# Patient Record
Sex: MALE | Race: WHITE | NOT HISPANIC OR LATINO | Employment: FULL TIME
[De-identification: names, ages, dates, MRNs, and addresses within clinical notes are randomized per-mention and may not be internally consistent; named-entity substitution may affect disease eponyms.]

---

## 2019-11-03 ENCOUNTER — HEALTH MAINTENANCE LETTER (OUTPATIENT)
Age: 29
End: 2019-11-03

## 2020-11-16 ENCOUNTER — HEALTH MAINTENANCE LETTER (OUTPATIENT)
Age: 30
End: 2020-11-16

## 2021-09-18 ENCOUNTER — HEALTH MAINTENANCE LETTER (OUTPATIENT)
Age: 31
End: 2021-09-18

## 2022-01-08 ENCOUNTER — HEALTH MAINTENANCE LETTER (OUTPATIENT)
Age: 32
End: 2022-01-08

## 2022-02-06 ENCOUNTER — TELEPHONE (OUTPATIENT)
Dept: INTERNAL MEDICINE | Facility: CLINIC | Age: 32
End: 2022-02-06
Payer: COMMERCIAL

## 2022-02-07 NOTE — TELEPHONE ENCOUNTER
Spoke to patient and let him know provider not taking new patients - offered appointment in March with Cassidy Reynolds but he will look for a provider closer to his house

## 2022-02-07 NOTE — TELEPHONE ENCOUNTER
Family practice pt is scheduled with me for a physical.  My practice is closed, please have him schedule PE with PCP.

## 2022-07-12 ENCOUNTER — HOSPITAL ENCOUNTER (EMERGENCY)
Facility: CLINIC | Age: 32
Discharge: HOME | End: 2022-07-12
Attending: EMERGENCY MEDICINE | Admitting: EMERGENCY MEDICINE

## 2022-07-12 VITALS
TEMPERATURE: 98.7 F | SYSTOLIC BLOOD PRESSURE: 134 MMHG | OXYGEN SATURATION: 97 % | HEART RATE: 78 BPM | RESPIRATION RATE: 18 BRPM | DIASTOLIC BLOOD PRESSURE: 81 MMHG

## 2022-07-12 DIAGNOSIS — F10.920 ALCOHOLIC INTOXICATION WITHOUT COMPLICATION (H): ICD-10-CM

## 2022-07-12 DIAGNOSIS — F32.A DEPRESSION, UNSPECIFIED DEPRESSION TYPE: ICD-10-CM

## 2022-07-12 LAB
ALBUMIN SERPL-MCNC: 4.4 G/DL (ref 3.4–5)
ALCOHOL BREATH TEST: 0.28 (ref 0–0.01)
ALP SERPL-CCNC: 47 U/L (ref 40–150)
ALT SERPL W P-5'-P-CCNC: 119 U/L (ref 0–70)
AMPHETAMINES UR QL SCN: NORMAL
ANION GAP SERPL CALCULATED.3IONS-SCNC: 9 MMOL/L (ref 3–14)
AST SERPL W P-5'-P-CCNC: 162 U/L (ref 0–45)
BARBITURATES UR QL: NORMAL
BASOPHILS # BLD AUTO: 0 10E3/UL (ref 0–0.2)
BASOPHILS NFR BLD AUTO: 0 %
BENZODIAZ UR QL: NORMAL
BILIRUB SERPL-MCNC: 0.4 MG/DL (ref 0.2–1.3)
BUN SERPL-MCNC: 14 MG/DL (ref 7–30)
CALCIUM SERPL-MCNC: 9 MG/DL (ref 8.5–10.1)
CANNABINOIDS UR QL SCN: NORMAL
CHLORIDE BLD-SCNC: 108 MMOL/L (ref 94–109)
CO2 SERPL-SCNC: 29 MMOL/L (ref 20–32)
COCAINE UR QL: NORMAL
CREAT SERPL-MCNC: 0.69 MG/DL (ref 0.66–1.25)
EOSINOPHIL # BLD AUTO: 0 10E3/UL (ref 0–0.7)
EOSINOPHIL NFR BLD AUTO: 0 %
ERYTHROCYTE [DISTWIDTH] IN BLOOD BY AUTOMATED COUNT: 13.4 % (ref 10–15)
GFR SERPL CREATININE-BSD FRML MDRD: >90 ML/MIN/1.73M2
GLUCOSE BLD-MCNC: 120 MG/DL (ref 70–99)
HCT VFR BLD AUTO: 45.5 % (ref 40–53)
HGB BLD-MCNC: 15.6 G/DL (ref 13.3–17.7)
IMM GRANULOCYTES # BLD: 0 10E3/UL
IMM GRANULOCYTES NFR BLD: 0 %
LYMPHOCYTES # BLD AUTO: 3.2 10E3/UL (ref 0.8–5.3)
LYMPHOCYTES NFR BLD AUTO: 36 %
MCH RBC QN AUTO: 31 PG (ref 26.5–33)
MCHC RBC AUTO-ENTMCNC: 34.3 G/DL (ref 31.5–36.5)
MCV RBC AUTO: 91 FL (ref 78–100)
MONOCYTES # BLD AUTO: 1.1 10E3/UL (ref 0–1.3)
MONOCYTES NFR BLD AUTO: 13 %
NEUTROPHILS # BLD AUTO: 4.6 10E3/UL (ref 1.6–8.3)
NEUTROPHILS NFR BLD AUTO: 51 %
NRBC # BLD AUTO: 0 10E3/UL
NRBC BLD AUTO-RTO: 0 /100
OPIATES UR QL SCN: NORMAL
PLAT MORPH BLD: NORMAL
PLATELET # BLD AUTO: 220 10E3/UL (ref 150–450)
POTASSIUM BLD-SCNC: 3.5 MMOL/L (ref 3.4–5.3)
PROT SERPL-MCNC: 7.8 G/DL (ref 6.8–8.8)
RBC # BLD AUTO: 5.03 10E6/UL (ref 4.4–5.9)
RBC MORPH BLD: NORMAL
SARS-COV-2 RNA RESP QL NAA+PROBE: NEGATIVE
SODIUM SERPL-SCNC: 146 MMOL/L (ref 133–144)
WBC # BLD AUTO: 9 10E3/UL (ref 4–11)

## 2022-07-12 PROCEDURE — U0003 INFECTIOUS AGENT DETECTION BY NUCLEIC ACID (DNA OR RNA); SEVERE ACUTE RESPIRATORY SYNDROME CORONAVIRUS 2 (SARS-COV-2) (CORONAVIRUS DISEASE [COVID-19]), AMPLIFIED PROBE TECHNIQUE, MAKING USE OF HIGH THROUGHPUT TECHNOLOGIES AS DESCRIBED BY CMS-2020-01-R: HCPCS | Performed by: EMERGENCY MEDICINE

## 2022-07-12 PROCEDURE — 250N000013 HC RX MED GY IP 250 OP 250 PS 637: Performed by: EMERGENCY MEDICINE

## 2022-07-12 PROCEDURE — 82075 ASSAY OF BREATH ETHANOL: CPT | Performed by: EMERGENCY MEDICINE

## 2022-07-12 PROCEDURE — C9803 HOPD COVID-19 SPEC COLLECT: HCPCS | Performed by: EMERGENCY MEDICINE

## 2022-07-12 PROCEDURE — 80053 COMPREHEN METABOLIC PANEL: CPT | Performed by: EMERGENCY MEDICINE

## 2022-07-12 PROCEDURE — 36415 COLL VENOUS BLD VENIPUNCTURE: CPT | Performed by: EMERGENCY MEDICINE

## 2022-07-12 PROCEDURE — 99285 EMERGENCY DEPT VISIT HI MDM: CPT | Mod: 25 | Performed by: EMERGENCY MEDICINE

## 2022-07-12 PROCEDURE — 85025 COMPLETE CBC W/AUTO DIFF WBC: CPT | Performed by: EMERGENCY MEDICINE

## 2022-07-12 PROCEDURE — 80307 DRUG TEST PRSMV CHEM ANLYZR: CPT | Performed by: EMERGENCY MEDICINE

## 2022-07-12 PROCEDURE — 99284 EMERGENCY DEPT VISIT MOD MDM: CPT | Performed by: EMERGENCY MEDICINE

## 2022-07-12 PROCEDURE — 90791 PSYCH DIAGNOSTIC EVALUATION: CPT

## 2022-07-12 RX ADMIN — NICOTINE POLACRILEX 4 MG: 4 GUM, CHEWING BUCCAL at 20:57

## 2022-07-12 RX ADMIN — NICOTINE POLACRILEX 4 MG: 4 GUM, CHEWING BUCCAL at 22:56

## 2022-07-12 RX ADMIN — NICOTINE POLACRILEX 4 MG: 4 GUM, CHEWING BUCCAL at 19:57

## 2022-07-12 NOTE — ED NOTES
Bed: HW07UR  Expected date: 7/12/22  Expected time: 6:45 PM  Means of arrival: Ambulance  Comments:  [facility] [place] 32[place], mental health eval, ETOH

## 2022-07-12 NOTE — ED TRIAGE NOTES
Triage Assessment     Row Name 07/12/22 1855       Triage Assessment (Adult)    Airway WDL WDL       Respiratory WDL    Respiratory WDL WDL       Skin Circulation/Temperature WDL    Skin Circulation/Temperature WDL WDL       Cardiac WDL    Cardiac WDL WDL       Peripheral/Neurovascular WDL    Peripheral Neurovascular WDL WDL       Cognitive/Neuro/Behavioral WDL    Cognitive/Neuro/Behavioral WDL WDL    Row Name 07/12/22 1850       Triage Assessment (Adult)    Airway WDL WDL       Respiratory WDL    Respiratory WDL WDL       Skin Circulation/Temperature WDL    Skin Circulation/Temperature WDL WDL       Cardiac WDL    Cardiac WDL WDL       Peripheral/Neurovascular WDL    Peripheral Neurovascular WDL WDL       Cognitive/Neuro/Behavioral WDL    Cognitive/Neuro/Behavioral WDL WDL

## 2022-07-12 NOTE — ED TRIAGE NOTES
Paramedics state that he has been binging for the last four days, girlfriend broke up with him. .     Triage Assessment     Row Name 07/12/22 3021       Triage Assessment (Adult)    Airway WDL WDL       Respiratory WDL    Respiratory WDL WDL       Skin Circulation/Temperature WDL    Skin Circulation/Temperature WDL WDL       Cardiac WDL    Cardiac WDL WDL       Peripheral/Neurovascular WDL    Peripheral Neurovascular WDL WDL       Cognitive/Neuro/Behavioral WDL    Cognitive/Neuro/Behavioral WDL WDL

## 2022-07-13 ASSESSMENT — ENCOUNTER SYMPTOMS
DYSPHORIC MOOD: 1
FEVER: 0
EYE REDNESS: 0
HEADACHES: 0
DIARRHEA: 0
COUGH: 0
CONSTIPATION: 0
DIFFICULTY URINATING: 0
BACK PAIN: 0
NAUSEA: 0
ABDOMINAL PAIN: 0
SHORTNESS OF BREATH: 0

## 2022-07-13 NOTE — ED NOTES
Patient's alcohol breath test is currently .284. Patient can receive DEC assessment upon sobering.

## 2022-07-13 NOTE — ED PROVIDER NOTES
ED Provider Note  [facility]      History     Chief Complaint   Patient presents with     Alcohol Intoxication     Patient states he is here for detox, relapsed a week ago, drinking a liter a day. He states that he has a hx of seizures.      Suicidal     HPI  Agustin Ac is a 31 year old male who presents to the emergency department initially reporting that he wanted inpatient detox for alcohol withdrawal, last drink earlier today.  Also having suicidal thoughts of hurting himself by drinking.  He otherwise has no plan.  He reports that he is also looking to establish mental health care with a counselor or therapist.  He has not on any medication currently.  He drinks approximately 1 L daily, has a history of seizures.  When I spoke to him, however, he reports that he is not interested in detox at this time.  He has no other medical complaints, in his usual state of health otherwise.    Past Medical History  Past Medical History:   Diagnosis Date     Allergic rhinitis, cause unspecified     trees, milk, soy     S/P tonsillectomy 3/31/2011    [facility]     Past Surgical History:   Procedure Laterality Date     NO HISTORY OF SURGERY       TONSILLECTOMY  3/31/2011    [facility]     azelastine (ASTELIN) 137 MCG/SPRAY nasal spray  MULTI-VITAMIN OR TABS  sertraline (ZOLOFT) 25 MG tablet      Allergies   Allergen Reactions     Ambien [Zolpidem Tartrate]      Strange behavior     Family History  Family History   Problem Relation Age of Onset     Family History Negative Father      Family History Negative Mother      Social History   Social History     Tobacco Use     Smoking status: Former Smoker     Packs/day: 1.00     Years: 1.00     Pack years: 1.00     Types: Cigarettes     Smokeless tobacco: Former User     Quit date: 3/31/2011     Tobacco comment: 5cpd   Substance Use Topics     Alcohol use: No     Alcohol/week: 0.0 standard drinks     Drug use: No      Past medical history, past surgical  history, medications, allergies, family history, and social history were reviewed with the patient. No additional pertinent items.       Review of Systems   Constitutional: Negative for fever.   HENT: Negative for congestion.    Eyes: Negative for redness.   Respiratory: Negative for cough and shortness of breath.    Cardiovascular: Negative for chest pain.   Gastrointestinal: Negative for abdominal pain, constipation, diarrhea and nausea.   Genitourinary: Negative for difficulty urinating.   Musculoskeletal: Negative for back pain.   Skin: Negative for rash.   Neurological: Negative for headaches.   Psychiatric/Behavioral: Positive for dysphoric mood and suicidal ideas.     A complete review of systems was performed with pertinent positives and negatives noted in the HPI, and all other systems negative.    Physical Exam   BP: 129/76  Pulse: 96  Temp: 98.2  F (36.8  C)  Resp: 18  SpO2: 98 %  Physical Exam  Constitutional:       General: He is awake. He is not in acute distress.     Appearance: Normal appearance. He is well-developed. He is not ill-appearing or toxic-appearing.   HENT:      Head: Atraumatic.   Eyes:      General: No scleral icterus.     Pupils: Pupils are equal, round, and reactive to light.   Cardiovascular:      Rate and Rhythm: Normal rate and regular rhythm.      Heart sounds: Normal heart sounds, S1 normal and S2 normal.   Pulmonary:      Effort: No respiratory distress.      Breath sounds: Normal breath sounds.   Abdominal:      General: Bowel sounds are normal.      Palpations: Abdomen is soft.      Tenderness: There is no abdominal tenderness.   Musculoskeletal:         General: No tenderness.   Skin:     General: Skin is warm.      Findings: No rash.   Neurological:      Mental Status: He is alert and oriented to person, place, and time.      Motor: No tremor.      Gait: Gait and tandem walk normal.   Psychiatric:         Mood and Affect: Mood normal.         Speech: Speech normal. Speech is  not slurred.         Behavior: Behavior is cooperative.         Thought Content: Thought content includes suicidal ideation.         ED Course      Procedures         Mental Health Risk Assessment      PSS-3    Date and Time Over the past 2 weeks have you felt down, depressed, or hopeless? Over the past 2 weeks have you had thoughts of killing yourself? Have you ever attempted to kill yourself? When did this last happen? User   07/12/22 1856 yes yes no -- AAP      C-SSRS ([place])    Date and Time Q1 Wished to be Dead (Past Month) Q2 Suicidal Thoughts (Past Month) Q3 Suicidal Thought Method Q4 Suicidal Intent without Specific Plan Q5 Suicide Intent with Specific Plan Q6 Suicide Behavior (Lifetime) Within the Past 3 Months? RETIRED: Level of Risk per Screen Screening Not Complete User   07/12/22 1856 yes yes yes no yes no -- -- -- AAP              Suicide assessment completed by mental health (D.EChandaC., LCSW, etc.)       Results for orders placed or performed during the hospital encounter of 07/12/22   Comprehensive metabolic panel     Status: Abnormal   Result Value Ref Range    Sodium 146 (H) 133 - 144 mmol/L    Potassium 3.5 3.4 - 5.3 mmol/L    Chloride 108 94 - 109 mmol/L    Carbon Dioxide (CO2) 29 20 - 32 mmol/L    Anion Gap 9 3 - 14 mmol/L    Urea Nitrogen 14 7 - 30 mg/dL    Creatinine 0.69 0.66 - 1.25 mg/dL    Calcium 9.0 8.5 - 10.1 mg/dL    Glucose 120 (H) 70 - 99 mg/dL    Alkaline Phosphatase 47 40 - 150 U/L     (H) 0 - 45 U/L     (H) 0 - 70 U/L    Protein Total 7.8 6.8 - 8.8 g/dL    Albumin 4.4 3.4 - 5.0 g/dL    Bilirubin Total 0.4 0.2 - 1.3 mg/dL    GFR Estimate >90 >60 mL/min/1.73m2   Asymptomatic COVID-19 Virus (Coronavirus) by PCR Nasopharyngeal     Status: Normal    Specimen: Nasopharyngeal; Swab   Result Value Ref Range    SARS CoV2 PCR Negative Negative    Narrative    Testing was performed using the ramila  SARS-CoV-2 & Influenza A/B Assay on the ramila  [employer]  System.  This test should be  ordered for the detection of SARS-COV-2 in individuals who meet SARS-CoV-2 clinical and/or epidemiological criteria. Test performance is unknown in asymptomatic patients.  This test is for in vitro diagnostic use under the FDA EUA for laboratories certified under CLIA to perform moderate and/or high complexity testing. This test has not been FDA cleared or approved.  A negative test does not rule out the presence of PCR inhibitors in the specimen or target RNA in concentration below the limit of detection for the assay. The possibility of a false negative should be considered if the patient's recent exposure or clinical presentation suggests COVID-19.  [facility] are certified under the Clinical Laboratory Improvement Amendments of 1988 (CLIA-88) as qualified to perform moderate and/or high complexity laboratory testing.   CBC with platelets and differential     Status: None   Result Value Ref Range    WBC Count 9.0 4.0 - 11.0 10e3/uL    RBC Count 5.03 4.40 - 5.90 10e6/uL    Hemoglobin 15.6 13.3 - 17.7 g/dL    Hematocrit 45.5 40.0 - 53.0 %    MCV 91 78 - 100 fL    MCH 31.0 26.5 - 33.0 pg    MCHC 34.3 31.5 - 36.5 g/dL    RDW 13.4 10.0 - 15.0 %    Platelet Count 220 150 - 450 10e3/uL    % Neutrophils 51 %    % Lymphocytes 36 %    % Monocytes 13 %    % Eosinophils 0 %    % Basophils 0 %    % Immature Granulocytes 0 %    NRBCs per 100 WBC 0 <1 /100    Absolute Neutrophils 4.6 1.6 - 8.3 10e3/uL    Absolute Lymphocytes 3.2 0.8 - 5.3 10e3/uL    Absolute Monocytes 1.1 0.0 - 1.3 10e3/uL    Absolute Eosinophils 0.0 0.0 - 0.7 10e3/uL    Absolute Basophils 0.0 0.0 - 0.2 10e3/uL    Absolute Immature Granulocytes 0.0 <=0.4 10e3/uL    Absolute NRBCs 0.0 10e3/uL   Drug abuse screen 1 urine (ED)     Status: Normal   Result Value Ref Range    Amphetamines Urine Screen Negative Screen Negative    Barbiturates Urine Screen Negative Screen Negative    Benzodiazepines Urine Screen Negative Screen Negative     Cannabinoids Urine Screen Negative Screen Negative    Cocaine Urine Screen Negative Screen Negative    Opiates Urine Screen Negative Screen Negative   RBC and Platelet Morphology     Status: None   Result Value Ref Range    Platelet Assessment  Automated Count Confirmed. Platelet morphology is normal.     Automated Count Confirmed. Platelet morphology is normal.    RBC Morphology Confirmed RBC Indices    Alcohol breath test POCT     Status: Abnormal   Result Value Ref Range    Alcohol Breath Test 0.284 (A) 0.00 - 0.01   Urine Drugs of Abuse Screen     Status: Normal    Narrative    The following orders were created for panel order Urine Drugs of Abuse Screen.  Procedure                               Abnormality         Status                     ---------                               -----------         ------                     Drug abuse screen 1 urin...[[id number]]  Normal              Final result                 Please view results for these tests on the individual orders.   CBC with platelets differential     Status: None    Narrative    The following orders were created for panel order CBC with platelets differential.  Procedure                               Abnormality         Status                     ---------                               -----------         ------                     CBC with platelets and d...[[id number]]                      Final result               RBC and Platelet Morphology[[id number]]                      Final result                 Please view results for these tests on the individual orders.     Medications   nicotine polacrilex (NICORETTE) gum 4 mg (4 mg Buccal Given 7/12/22 1957)   nicotine polacrilex (NICORETTE) gum 4 mg (4 mg Buccal Given 7/12/22 2057)   nicotine polacrilex (NICORETTE) gum 4 mg (4 mg Buccal Given 7/12/22 2256)        Assessments & Plan (with Medical Decision Making)   Agustin Fernandezgilbertochiquita is a 31 year old male who presents to the emergency department  initially reporting that he wanted inpatient detox for alcohol withdrawal, last drink earlier today.  He is not in withdrawal currently and appears to be clinically sober.  He has no interest in detox on my deeper questioning.  He would however like mental health treatment.  Behavioral health  consulted, appreciate their recommendations.  Screening labs sent and are unrevealing and reassuring    I have reviewed the nursing notes. I have reviewed the findings, diagnosis, plan and need for follow up with the patient.    Patient not actually actively suicidal with a true plan.  He is an excellent candidate at this time for outpatient therapy.  Behavioral health  able to establish outpatient follow-up for him.  Return precautions given.  Okay to discharge, patient's father arrived at bedside to assist with taking him home.    Discharge Medication List as of 7/12/2022 11:00 PM          Final diagnoses:   Alcoholic intoxication without complication (H)   Depression, unspecified depression type       --  Nathan Shah MD PhD  [facility] EMERGENCY DEPARTMENT  7/12/2022     Romeo Shah MD  07/13/22 0027

## 2022-07-13 NOTE — ED NOTES
Patient was given discharge paperwork and belongings, no questions or concerns. Vitals stable. Father present to  patient.

## 2022-07-13 NOTE — PROGRESS NOTES
The following information was received from Joe whose relationship to the patient is father. Information was obtained via phone. Their phone number is [phone] and they last had contact with patient over the phone today.     What happened today: He is an alcoholic. He has been drinking for years. He will drink on his days off. He will drink, pass out, get up and do it again unless he has to work. Pts girlfriend broke up with him and pt has been struggling with this loss.     What is different about patient's functioning: Pt called dad today and was saying all kinds of things, including that he did not want to live anymore. Dad was concerned and called 911. Mom was a bad alcoholic.  Dad found her dead in the bathtub, drank herself to death. Pt was 17 at the time and blames himself for this.  He has been telling dad that he is insane and hears voices in his head and that is why he drinks.     Concern about alcohol/drug use: Yes daily drinking     What do you think the patient needs:  He needs treatment.  He needs to talk to a professional. He is afraid he is going to lose his job and that is why he is saying he wants to come home now.     Has patient made comments about wanting to kill themselves/others:  Yes today he said he just wants to die.  He has not made these types of comments to dad in the past. He told dad that today he has been thinking of suicide alot.  He did not identify a plan.     If d/c is recommended, can they take part in safety/aftercare planning: Yes but I don't think he should be discharged. Pt does not have access to a gun. He lives across the street from pt and is a good support.

## 2022-07-13 NOTE — CONSULTS
Diagnostic Evaluation Consultation  Crisis Assessment    Patient was assessed: in person  Patient location: [facility] ED  Was a release of information signed: No. Reason: no providers      Referral Data and Chief Complaint  Agustin Ac is a 31 year old, who uses he/him pronouns, and presents to the ED via EMS. Patient is referred to the ED by family/friends. Patient is presenting to the ED for the following concerns: suicidal ideation, depression.      Informed Consent and Assessment Methods     Patient is his own guardian. Writer met with patient and explained the crisis assessment process, including applicable information disclosures and limits to confidentiality, assessed understanding of the process, and obtained consent to proceed with the assessment. Patient was observed to be able to participate in the assessment as evidenced by participation. Assessment methods included conducting a formal interview with patient, review of medical records, collaboration with medical staff, and obtaining relevant collateral information from family and community providers when available..     Over the course of this crisis assessment provided reassurance, offered validation, engaged patient in problem solving and disposition planning and worked with patient on safety and aftercare planning. Patient's response to interventions was positive     Summary of Patient Situation     Patient presents to [facility] ED via EMS after feeling suicidal while drinking at home, prompting his concerned father to call 911. Patient arrived with a .284 breathalyzer result. Patient has not engaged treatment for ETOH in the past and expressed no interest in engaging at this time. He said he just broke up with his girlfriend of 9 months, was feeling depressed, and drank in excess to the concern of his father who lives across the street, resulting in a 911 call and current presentation. Patient is not currently suicidal, despite utterances in triage of  "drinking himself to death. Patient is in need, and has requested, a therapist, and as he is not currently on any medications, medication management as well. This will be coordinated through [facility] and information provided to him on his discharge. Patient has not previously presented for any mental health in the past, and shows two previous presentations for ETOH in 2012 and 2014. Patient acknowledged the poor decision to drink to excess, resulting in a depressed mood and challenging thoughts, and stated he is not suicidal nor has he ever been. He emphasized that this is situational and related directly to his breakup from someone he cared about.     Brief Psychosocial History     Patient lives in his own apartment in [place]. His father lives across the street. Patient is a  and has been for years. He currently is employed as a  at the [employer], at [address], [place] [address] where he has been for the past several months. Patient is not a , \"has no time for hobbies\", has no legal issues save for a DUI several years ago, and is seeking a therapist and medication management for his depression.     Significant Clinical History     Patient has previously been prescribed Zoloft and Bupropion 10 years ago for his depression. He has not been on medication or engaged a therapist in years. Patient has a diagnosis of Major Depressive Disorder, Recurrent, Moderate, and is the current diagnosis as well. Patient has no treatment team, no PCP, saying he has been healthy and not in need of services. He did endorse seeing previously a Dr. Alfonzo Trujillo of [facility], but a search came up empty. Patient has no previous hospitalizations or mental health presentations, and was last seen for ETOH issues on 6/28/14, and before that on 3/18/12. Nothing else follows.      Collateral Information    The following information was received from Joe" whose relationship to the patient is father. Information was obtained via phone. Their phone number is [phone] and they last had contact with patient over the phone today.      What happened today: He is an alcoholic. He has been drinking for years. He will drink on his days off. He will drink, pass out, get up and do it again unless he has to work. Pts girlfriend broke up with him and pt has been struggling with this loss.      What is different about patient's functioning: Pt called dad today and was saying all kinds of things, including that he did not want to live anymore. Dad was concerned and called 911. Mom was a bad alcoholic.  Dad found her dead in the bathtub, drank herself to death. Pt was 17 at the time and blames himself for this.  He has been telling dad that he is insane and hears voices in his head and that is why he drinks.      Concern about alcohol/drug use: Yes daily drinking      What do you think the patient needs:  He needs treatment.  He needs to talk to a professional. He is afraid he is going to lose his job and that is why he is saying he wants to come home now.      Has patient made comments about wanting to kill themselves/others:  Yes today he said he just wants to die.  He has not made these types of comments to dad in the past. He told dad that today he has been thinking of suicide alot.  He did not identify a plan.      If d/c is recommended, can they take part in safety/aftercare planning: Yes but I don't think he should be discharged. Pt does not have access to a gun. He lives across the street from pt and is a good support.            Risk Assessment  ESS-6  1.a. Over the past 2 weeks, have you had thoughts of killing yourself? Yes  1.b. Have you ever attempted to kill yourself and, if yes, when did this last happen? No   2. Recent or current suicide plan? No   3. Recent or current intent to act on ideation? No  4. Lifetime psychiatric hospitalization? No  5. Pattern of  excessive substance use? Yes  6. Current irritability, agitation, or aggression? No  Scoring note: BOTH 1a and 1b must be yes for it to score 1 point, if both are not yes it is zero. All others are 1 point per number. If all questions 1a/1b - 6 are no, risk is negligible. If one of 1a/1b is yes, then risk is mild. If either question 2 or 3, but not both, is yes, then risk is automatically moderate regardless of total score. If both 2 and 3 are yes, risk is automatically high regardless of total score.      Score: 1, moderate risk      Does the patient have access to lethal means? No     Does the patient engage in non-suicidal self-injurious behavior (NSSI/SIB)? no     Does the patient have thoughts of harming others? No     Is the patient engaging in sexually inappropriate behavior?  no        Current Substance Abuse     Is there recent substance abuse? Substance type(s): ETOH Frequency: daily according to father Quantity: 1 L Method: drink Duration: unclear Last use: today     Was a urine drug screen or blood alcohol level obtained: Yes .284       Mental Status Exam     Affect: Appropriate   Appearance: Disheveled    Attention Span/Concentration: Attentive  Eye Contact: Engaged   Fund of Knowledge: Appropriate    Language /Speech Content: Fluent   Language /Speech Volume: Normal    Language /Speech Rate/Productions: Normal    Recent Memory: Intact   Remote Memory: Intact   Mood: Normal    Orientation to Person: Yes    Orientation to Place: Yes   Orientation to Time of Day: Yes    Orientation to Date: Yes    Situation (Do they understand why they are here?): Yes    Psychomotor Behavior: Normal    Thought Content: Clear   Thought Form: Intact      History of commitment: No       Medication    Psychotropic medications: No current medications but a history of Zoloft, Bupropion.  Medication changes made in the last two weeks: No       Current Care Team    Primary Care Provider: No  Psychiatrist: No  Therapist: No  Case  Manager: No     CTSS or ARMHS: No  ACT Team: No  Other: No      Diagnosis    296.32 (F33.1) Major Depressive Disorder, Recurrent Episode, Moderate _  By history      Clinical Summary and Substantiation of Recommendations     Patient is discharged to home and will call his father for a ride. Patient is seeking medication management for his depression, and a therapist following the breakup of a 9 month relationship. Patient declined detox or assessment information for ETOH treatment, and declined admission for depression or suicide risk. Patient stated that following the breakup with his girlfriend of 9 months he was feeling down, drank too much, and his concerned father wanted him to be seen and assessed. Patient denies current suicidal ideation, homicidal ideation, or self injurious behaviors, and has requested services following his discharge. [facility] has set up a medication provider for med management, and a therapist appointment. Patient is discharged to home.   Disposition    Recommended disposition: Individual Therapy and Medication Management       Reviewed case and recommendations with attending provider. Attending Name: Dr. ENEIDA Shah MD       Attending concurs with disposition: Yes       Patient concurs with disposition: Yes       Guardian concurs with disposition: NA      Final disposition: Individual therapy  and Medication management.     Outpatient Details (if applicable):   Aftercare plan and appointments placed in the AVS and provided to patient: Yes. Given to patient by RN/P Coordinator    Was lethal means counseling provided as a part of aftercare planning? No;       Assessment Details    Patient interview started at: 10:00pm and completed at: 10:30pm.     Total duration spent on the patient case in minutes: 1.50 hrs      CPT code(s) utilized: 14273 - Psychotherapy for Crisis - 60 (30-74*) min       Arnaldo De Jesus MA Psychotherapist Trainee  [facility] - Triage & Transition Services      If I am feeling  unsafe or I am in a crisis, I will:   Contact my established care providers   Call the National Suicide Prevention Lifeline: [phone]   Go to the nearest emergency room   Call 482          Warning signs that I or other people might notice when a crisis is developing for me: My depression is deepening and I am not able to manage it. An increase in suicidal thoughts.    Things I am able to do on my own to cope or help me feel better: Take time when I need it. Keep active. Don't drink for the effect!! Or when depressed (bad combo)!!    Things that I am able to do with others to cope or help me better: Hang o0ut and enjoy company and friends/family.     Things I can use or do for distraction: Explore new recipes. Teach a cooking class.     Changes I can make to support my mental health and wellness: Make and keep appointments for therapist. Take medications as prescribed, even when you are feeing better.    People in my life that I can ask for help: Dad. Friends/coworker that I trust.    Your [facility] has a mental health crisis team you can call 24/7: [facility] Crisis:  [phone]    Other things that are important when I m in crisis: *I am not alone, I have resources. Just ask for help or come to the ER.    Additional resources and information:     Substance Abuse Resources    To access substance abuse treatment you must have an assessment complete or have an updated assessment within 30 days of starting any program.  Information for this to be completed and to secure funding if you have medical assistance or no insurance can be found through your [facility] intake line.    If you have private insurance, call the customer service number on the back of your insurance card to find an in-network provider for substance abuse assessment. The ideal provider will be a treatment facility, licensed in the [facility].    For those with Medicaid with the [facility], you will need a Rule 25 assessment:  The following are phone numbers for each [facility]. Rule 25 assessments must be completed by the county you reside in currently. Once approved for funding you can connect with a facility that does Rule 25 assessment.  [place] - [phone]  [place] - [phone]  [place] - [phone]  [facility] [phone]  [facility] [phone]  [place] - [phone]  [place] - [phone]  [facility] [phone]    The following facilities offer Rule 25/chemical health assessments:    [facility]  [phone]  Mon-Friday: [address] [facility] [address] [place] [address] [facility], [address]  Saturday:  [address] [facility], [address]  M-F assessments (7a-1:45p); Saturday assessments (7:45-10:45a)    [facility]  [phone]  [address] [place], [place], [address]  *by appointment only M-W; walk ins available Fridays from 10-2.    [facility]  [phone] (phone consultation available 24/7)  In-person Assessments:  [address] [facility], [address], [place], [place] [address] [address] [place] [address]  [address] [place] [address]  [address] [place] [address]     [facility]  [phone]  [address] [facility]  [place], [address]  *walk in and appointments available by calling    Located within [facility]  [phone] [address] [place], [place], [address]  *by appointment only M-Th    [facility]  [phone]  [address] [place], [place], [address]  *walk ins available M-F    [facility]  [phone] [address] [place], [place], [address]  *available by appointments only      [facility]  [phone]  [address] [place], [place], [address]  *available by appointment only      [facility]  [phone]  [facility] - Outpatient Mental Health and Addiction  [address] [place], [place], [address]    [facility] Outpatient Alcohol and Drug Abuse Program (ADAP)  [phone]  [address] [place] [address]  [address]  [place], [place], [address]  *Walk in assessments also available M-F starting at 8 am.    [facility]  [phone]  [address] [place], [place], [address]    *available by appointments      [facility]  [phone]  [address] [place], [place], [address]  *walk in assessments available M-F starting at 7 am.    [facility]  [phone]  [facility]  [address] [place], [address]  *Walk in assessments availble M-F starting at 8 am OR [phone]    [facility]  [address] [place] [address]  *Walk in assessments available M-F starting at 8 am    [facility]  [phone]  [address] [place] [address]    [facility]  [phone]  [address] [place], [place], [address]    *available by appointment only    [facility]  [phone]  [address] [facility], [address]    [facility]  [phone]  [address] [place], [place], [address]  *available by appointment    [facility]  [phone]  [address] [place], [address]  *walk ins available M-TH from 9-3    [facility]  [phone]  [address] [place], [place], [address]    [facility]  [phone]  [address] [place] [address]  [address] [address] [address]    [facility] (Does Rule 25 Assessments)  [email or web address]  [phone]  [address] [place] [address], [address], [place] [address]    [facility]  [email or web address]  [phone], [address], [place] [address]  [phone], [address], [place] [address]  [phone], [address] [facility], [place], [place] [address] [phone], [address] [facility] , [address], ([facility]), [place], [place] [id number] [phone], [address] [place] [address]  [place] [address]      If you are intoxicated, you may be required to detox at a detox facility before starting treatment. The following are detox facilities that you can self present to. All detox facilities are able to help you complete an assessment/rule 25 prior to discharge if you choose:      [facility]: [address] [place], [place], [address].         [phone]    [facility]: [address] [place], [place], [address]  [phone]    [facility]: [address] [place], [address]        [phone]    [facility]: [address] [place], [place], [address] [phone]              It is recommended that you abstain from all mood altering chemicals. Please contact the sober support hotline (579-192-7752) as needed; phones are answered 24 hours a day, 7 days a week. Other support hotlines include:    [facility] Mental Health & Addiction: [phone]    [facility] Support Line: [phone]              Ways to help cope with sobriety:    -- Take prescribed medicines as scheduled  -- Keep follow-up appointments  -- Talk to others about your concerns  -- Get regular exercise    -- Practice deep breathing skills  -- Eat a healthy diet  -- Use community resources, including hotline numbers, [facility] crisis and support meetings  -- Stay sober and avoid places/people/things associated with substance use    --Maintain a daily schedule/routine    --Get at least 7-8 hours of sleep per night    --Create a list 10--20 healthy activities that you can do that are enjoyable and do not involve substance use    --Create daily goals (approx. 1-4 goals) per day and work to achieve them throughout the day.                   [place] [facility] ([facility])  [facility] connects people seeking recovery to resources that help foster and sustain long-term recovery. Whether you are seeking resources for treatment, transportation, housing, job training, education, health care or other pathways to  [facility] is a great place to start.    Phone: [phone]. www.[employer].org (Great listing of all types of recovery and non-recovery related resources)              Alcoholics Anonymous    Phone: 6-402-PITKYNP    Website: HTTP://WWW.AA.ORG/         [facility] [place] (470-399-1138 or http://a[place].org)    [facility] [place] (383-527-7819 or www.aastpaul.org)         Narcotics Anonymous    Phone: [phone]    Website: www.[employer].                   [employer] [address], [place], [place],    Phone: [phone]    Drop-in Hours: Monday-Friday 9-11:30 am. By appointment at other times.    Provides: [facility] is a drop-in center on the east side [facility] that provides a safe space for individuals who are homeless and have a history of chemical use. Sobriety is not a requirement but drugs and alcohol are not allowed on the property.    Services: Non-clients can access drop-in services such as Recovery and Harm Reduction Groups, referrals to case management, community activities, shower facilities, and a pool table. Individuals who are homeless and have chemical health needs may be eligible for enrollment into [employer]'s case management program. Clients and  work together to access benefits, treatment, health care, shelter, and external housing resources.         [facility] Chemical Assessment & Referral Unit    [address] [place], [place]    Phone: [phone]    Hours: Monday-Friday 8 am-5 pm.    Provides: Rule 25 assessment and referral for individuals seeking treatment or counseling for chemical dependency. Must be a resident of [facility]. There is no fee for assessment. There is some funding available for treatment programs.         [facility]    [address] [facility] Phone: [phone] Main: [phone]    Hours: Monday through Friday 7 am-5 pm    Provides: Daily drop-in Rule 25 assessments and  weekly mental health assessments and services. Outpatient chemical dependency treatment (with sober recovery housing), relapse prevention, case management, and employment services. Outpatient and residential treatment for women with children. Specializes in assisting individuals with a history of relapse who face multiple barriers to achieving stable recovery.    Remarks: No charge for most services or services can be billed through insurance. Gender-specific services and treatment for co-occurring substance abuse and mental health concerns offered.

## 2022-07-13 NOTE — DISCHARGE INSTRUCTIONS
You are scheduled for the following appointments:     Therapy  Date: Wednesday, 7/20/2022  Time: 9:00 am - 10:00 am  Provider: Lisbeth Yang  Location: [facility], [place], [address], [address], [place] [address]  Phone: [phone]  Type: Therapy - Initial (In-Person)  Instructions: Arrive 15 minutes early to find adequate parking     Medication Management   Date: Wednesday, 8/10/2022  Time: 12:00 pm - 1:00 pm  Provider: Megan Sood MA, CNP,RN  Location: [facility], [address], [address], [place] [address]  Phone: [phone]  Type: Medication Mgmt - Initial (In-Person)  Instructions: Please fill New Patient Form by using following link. All forms need to be completed 72 hours prior to the appointment date/time by going to [email or web address] Please call us on [phone] 96 hours prior to your scheduled appointment to confirm that you are able to attend. We will provide you information about how to log into video call software when you call.    If I am feeling unsafe or I am in a crisis, I will:   Contact my established care providers   Call the National Suicide Prevention Lifeline: 742.297.6484   Go to the nearest emergency room   Call [phone]          Warning signs that I or other people might notice when a crisis is developing for me: My depression is deepening and I am not able to manage it. An increase in suicidal thoughts.    Things I am able to do on my own to cope or help me feel better: Take time when I need it. Keep active. Don't drink for the effect!! Or when depressed (bad combo)!!    Things that I am able to do with others to cope or help me better: Hang o0ut and enjoy company and friends/family.     Things I can use or do for distraction: Explore new recipes. Teach a cooking class.     Changes I can make to support my mental health and wellness: Make and keep appointments for therapist. Take medications as prescribed,  even when you are feeing better.    People in my life that I can ask for help: Dad. Friends/coworker that I trust.    Your county has a mental health crisis team you can call 24/7: [facility]:  [phone]    Other things that are important when I m in crisis: *I am not alone, I have resources. Just ask for help or come to the ER.    Additional resources and information:     Substance Abuse Resources    To access substance abuse treatment you must have an assessment complete or have an updated assessment within 30 days of starting any program.  Information for this to be completed and to secure funding if you have medical assistance or no insurance can be found through your [facility]'s chemical health intake line.    If you have private insurance, call the customer service number on the back of your insurance card to find an in-network provider for substance abuse assessment. The ideal provider will be a treatment facility, licensed in the [facility].    For those with Medicaid with the [facility], you will need a Rule 25 assessment: The following are phone numbers for each [facility]. Rule 25 assessments must be completed by the county you reside in currently. Once approved for funding you can connect with a facility that does Rule 25 assessment.  [facility] [phone]  [place] - [phone]  [facility] [phone]  [facility] [phone]  [facility] [phone]  [facility] [phone]  [place] - [phone]  [facility] [phone]    The following facilities offer Rule 25/chemical health assessments:    [facility]  [phone]  Mon-Friday: [address] [facility], [address]  Saturday:  [address] [facility], [address]  M-F assessments (7a-1:45p); Saturday assessments (7:45-10:45a)    [facility]  [phone]  [address] [place], [place], [address]  *by appointment only M-W; walk ins available Fridays from 10-2.    [facility]  [phone] (phone consultation available  24/7)  In-person Assessments:  [address], [place] [address]  [address] [address]  [address], [place] [address]  [address] [place] [address]     [facility]  [phone]  [address]  [place], [address]  *walk in and appointments available by calling    [facility]  [phone] [address] [place], [place], [address]  *by appointment only M-Th    [facility]  [phone]  [address] [place], [place], [address]  *walk ins available M-F    [facility]  [phone] [address] [place], [address]  *available by appointments only      [facility]  [phone]  [address] [place], [place], [address]  *available by appointment only      [facility]  [phone]  [facility]  [address] [place], [place], [address]    [facility] ([facility])  [phone]  [address] [place] [address], [address]  *Walk in assessments also available M-F starting at 8 am.    [facility]  [phone]  [address] [place], [place], [address]    *available by appointments      [facility]  [phone]  [address] [place], [place], [address]  *walk in assessments available M-F starting at 7 am.    [facility]  [phone]  [facility]  [address] [place], [place], [address]  *Walk in assessments availble M-F starting at 8 am OR [phone]    [facility]  [address], [place] [address]  *Walk in assessments available M-F starting at 8 am    [facility]  [phone]  [address] [place] [address]    [facility]  [phone]  [address] [place] [address], [address]    *available by appointment only    [facility]  [phone] [address]  [place], [address]    [facility]  [phone]  [address]  [place], [address]  *available by appointment    [facility]  [phone]  [address]  [place], [address]  *walk ins available M-TH from 9-3    [facility]  [phone]  [address]  [place], [address]    [facility]  [phone] [address] [place] [address]  [address] [address] [address]    [facility] (Does Rule 25 Assessments)  [email or web address]  [phone]  [address], [place] [address]    [facility]  [email or web address]  [phone], [address], [place] [address]  [phone], [address], [place], [place] [address] [phone], [address], [place], [place] [address] [phone], [address], ([facility]), [place], [place] [address] [phone], [address], [place] [address]      If you are intoxicated, you may be required to detox at a detox facility before starting treatment. The following are detox facilities that you can self present to. All detox facilities are able to help you complete an assessment/rule 25 prior to discharge if you choose:      [facility]: [address] [place], [address].         [phone]    [facility]: [address] [place], [place], [address]  [phone]    [facility]: [address] [place], [place], [address] [phone]    [facility]: [address] [place], [place], [address]  [phone]              It is recommended that you abstain from all mood altering chemicals. Please contact the sober support hotline (622-507-3445) as needed; phones are answered 24 hours a day, 7 days a week. Other support hotlines include:    [facility]  Addiction: 4-540-626-4125    [facility] Support Line: [phone]              Ways to help cope with sobriety:    -- Take prescribed medicines as scheduled  -- Keep follow-up appointments  -- Talk to others about your concerns  -- Get regular exercise    -- Practice deep breathing skills  -- Eat a healthy diet  -- Use community resources, including hotline numbers, [facility] and support meetings  -- Stay sober and avoid places/people/things associated with substance use    --Maintain a daily schedule/routine    --Get at least 7-8 hours of sleep per night    --Create a list 10--20 healthy activities that you can do that are enjoyable and do not involve substance use    --Create daily goals (approx. 1-4 goals) per day and work to achieve them throughout the day.                   [place] [facility] ([facility])  [facility] connects people seeking recovery to resources that help foster and sustain long-term recovery. Whether you are seeking resources for treatment, transportation, housing, job training, education, health care or other pathways to recovery, [facility] is a great place to start.    Phone: 910.134.7143. www.[facility]-IT (Great listing of all types of recovery and non-recovery related resources)              Alcoholics Anonymous    Phone: 8-535-ALCOHOL    Website: HTTP://WWW.AA.ORG/         [facility] [place] (812-101-9035 or http://a[place].org)    [facility] [place] (157-047-0790 or www.a[place].org)         Narcotics Anonymous    Phone: 960.701.8955    Website: www.[employer]                   [employer] [address], [place], [place],    Phone: [phone]    Drop-in Hours: Monday-Friday 9-11:30 am. By appointment at other times.    Provides: [facility] is a drop-in center on the [place] of [place] that provides a safe space for individuals who are homeless and have a history of chemical use. Sobriety is not a requirement but drugs and alcohol are not  allowed on the property.    Services: Non-clients can access drop-in services such as Recovery and Harm Reduction Groups, referrals to case management, community activities, shower facilities, and a pool table. Individuals who are homeless and have chemical health needs may be eligible for enrollment into [facility]'s case management program. Clients and  work together to access benefits, treatment, health care, shelter, and external housing resources.         [facility] Chemical Assessment & Referral [facility]    [address] [place], [place]    Phone: [phone]    Hours: Monday-Friday 8 am-5 pm.    Provides: Rule 25 assessment and referral for individuals seeking treatment or counseling for chemical dependency. Must be a resident of [facility]. There is no fee for assessment. There is some funding available for treatment programs.         Andre    [address] [facility] Phone: [phone] Main: [phone]    Hours: Monday through Friday 7 am-5 pm    Provides: Daily drop-in Rule 25 assessments and weekly mental health assessments and services. Outpatient chemical dependency treatment (with sober recovery housing), relapse prevention, case management, and employment services. Outpatient and residential treatment for women with children. Specializes in assisting individuals with a history of relapse who face multiple barriers to achieving stable recovery.    Remarks: No charge for most services or services can be billed through insurance. Gender-specific services and treatment for co-occurring substance abuse and mental health concerns offered.

## 2022-07-13 NOTE — ED NOTES
Patient has been feeling depressed with his life, and states he can't stop thinking about dying, and when asked upon a plan he states he supposes he would drink himself to death. He states his girlfriend broke up with him and is having a lot of stress. Prior to being searched he vaped in front of the RN, when this RN told him to give it to her, he continued to vape until a male came to his bedside. He later apologized, and asked for nicotine water. He was given ice water.

## 2022-07-13 NOTE — ED NOTES
AIDET: done    Security ( Screen ): done    Belongings:  [  1 ] BAG(s)    CELLPHONE  [ YES ]  WALLET   [ YES ]    >w/pt: cellphone, clothing      >rack: two vape pens, wallet, shoes      >security:   none

## 2022-08-22 ENCOUNTER — TELEPHONE (OUTPATIENT)
Dept: BEHAVIORAL HEALTH | Facility: CLINIC | Age: 32
End: 2022-08-22

## 2022-08-23 ASSESSMENT — ANXIETY QUESTIONNAIRES
1. FEELING NERVOUS, ANXIOUS, OR ON EDGE: NEARLY EVERY DAY
8. IF YOU CHECKED OFF ANY PROBLEMS, HOW DIFFICULT HAVE THESE MADE IT FOR YOU TO DO YOUR WORK, TAKE CARE OF THINGS AT HOME, OR GET ALONG WITH OTHER PEOPLE?: SOMEWHAT DIFFICULT
5. BEING SO RESTLESS THAT IT IS HARD TO SIT STILL: MORE THAN HALF THE DAYS
6. BECOMING EASILY ANNOYED OR IRRITABLE: NOT AT ALL
4. TROUBLE RELAXING: SEVERAL DAYS
7. FEELING AFRAID AS IF SOMETHING AWFUL MIGHT HAPPEN: NEARLY EVERY DAY
GAD7 TOTAL SCORE: 15
GAD7 TOTAL SCORE: 15
3. WORRYING TOO MUCH ABOUT DIFFERENT THINGS: NEARLY EVERY DAY
7. FEELING AFRAID AS IF SOMETHING AWFUL MIGHT HAPPEN: NEARLY EVERY DAY
2. NOT BEING ABLE TO STOP OR CONTROL WORRYING: NEARLY EVERY DAY
IF YOU CHECKED OFF ANY PROBLEMS ON THIS QUESTIONNAIRE, HOW DIFFICULT HAVE THESE PROBLEMS MADE IT FOR YOU TO DO YOUR WORK, TAKE CARE OF THINGS AT HOME, OR GET ALONG WITH OTHER PEOPLE: SOMEWHAT DIFFICULT
GAD7 TOTAL SCORE: 15

## 2022-08-23 ASSESSMENT — PATIENT HEALTH QUESTIONNAIRE - PHQ9
SUM OF ALL RESPONSES TO PHQ QUESTIONS 1-9: 9
SUM OF ALL RESPONSES TO PHQ QUESTIONS 1-9: 9
10. IF YOU CHECKED OFF ANY PROBLEMS, HOW DIFFICULT HAVE THESE PROBLEMS MADE IT FOR YOU TO DO YOUR WORK, TAKE CARE OF THINGS AT HOME, OR GET ALONG WITH OTHER PEOPLE: SOMEWHAT DIFFICULT

## 2022-08-24 ENCOUNTER — HOSPITAL ENCOUNTER (OUTPATIENT)
Dept: BEHAVIORAL HEALTH | Facility: CLINIC | Age: 32
Discharge: HOME | End: 2022-08-24
Attending: FAMILY MEDICINE | Admitting: FAMILY MEDICINE
Payer: COMMERCIAL

## 2022-08-24 VITALS — WEIGHT: 175 LBS | HEIGHT: 72 IN | BODY MASS INDEX: 23.7 KG/M2

## 2022-08-24 DIAGNOSIS — F10.20 ALCOHOL USE DISORDER, SEVERE, DEPENDENCE (H): ICD-10-CM

## 2022-08-24 PROCEDURE — H0001 ALCOHOL AND/OR DRUG ASSESS: HCPCS | Mod: GT,95

## 2022-08-24 ASSESSMENT — COLUMBIA-SUICIDE SEVERITY RATING SCALE - C-SSRS
TOTAL  NUMBER OF ABORTED OR SELF INTERRUPTED ATTEMPTS LIFETIME: NO
2. HAVE YOU ACTUALLY HAD ANY THOUGHTS OF KILLING YOURSELF?: NO
1. HAVE YOU WISHED YOU WERE DEAD OR WISHED YOU COULD GO TO SLEEP AND NOT WAKE UP?: NO
6. HAVE YOU EVER DONE ANYTHING, STARTED TO DO ANYTHING, OR PREPARED TO DO ANYTHING TO END YOUR LIFE?: NO
ATTEMPT LIFETIME: NO
TOTAL  NUMBER OF INTERRUPTED ATTEMPTS LIFETIME: NO

## 2022-08-24 ASSESSMENT — PAIN SCALES - GENERAL: PAINLEVEL: NO PAIN (0)

## 2022-08-24 NOTE — PROGRESS NOTES
[address] [address]  8/24/2022    Agustin Ac  [address] [facility] [address]      Agustin,    This is to verify that Agustin Ac (1990) participated in a substance use disorder assessment via a video visit with MARY Salazar/VINEET at [facility] in [place], [place] on 8/24/2022.    Recommendations:  1.)  It was recommended the patient abstain from alcohol and from all other non-prescribed mood altering chemicals.   2.)  Have a mental health evaluation to address his current clinical mental health issues.  3.)  Follow all of the recommendations of his medical and mental health providers.  4.)  Follow all of the terms and conditions of his employer, including participating in random alcohol and drug screening with his employer as directed.  5.)  Enter the Lodging Plus program at [facility] in [place], [place] or a similar residential or board and lodging treatment program for substance use disorder treatment.  6.)  Follow all of the recommendations of his substance use disorder treatment providers including entering an extended care program as needed.    If you have any additional questions or concerns, please feel free to contact me by any of the contact methods listed below.    Sincerely,    MARY Alexander, [facility]  CD Evaluation Counselor  [address] [address]  [email or web address]  Tel: [phone]  Fax: [phone]

## 2022-08-24 NOTE — PROGRESS NOTES
[facility]  Provider [facility]  [facility]     Telephone: [phone]      PATIENT'S NAME: Agustin Ac  PREFERRED NAME: Agustin  PRONOUNS: he/him/his    MRN: [id number]  : 1990   ACCT. NUMBER:  [id number]  DATE OF SERVICE: 2022  START TIME: 7:58 am  END TIME: 9:11 am  E-MAIL: [email or web address]   PREFERRED PHONE: [phone]   May we leave a program related message: Yes  ADDRESS:   [address] [place] [id number]  SERVICE MODALITY:  Video Visit:        Provider verified identity through the following two step process.  Patient provided:  Patient  (1990) and Patient's last 4 digits of N ([id number])    Telemedicine Visit: The patient's condition can be safely assessed and treated via synchronous audio and visual telemedicine encounter.      Reason for Telemedicine Visit: Services only offered telehealth    Originating Site (Patient Location): Patient's home    Distant Site (Provider Location): Provider Remote Setting    Consent:  The patient/guardian has verbally consented to: the potential risks and benefits of telemedicine (video visit) versus in person care; bill my insurance or make self-payment for services provided; and responsibility for payment of non-covered services.     Patient would like the video invitation sent by:  My Chart    Mode of Communication:  [facility]    EMERGENCY CONTACT:   Damien Ac (father)  Tel #: [phone]    UNIVERSAL ADULT SUBSTANCE USE DISORDER DIAGNOSTIC ASSESSMENT    Identifying Information:  The patient is a 32 year old, /White male of Polish decent.  The pronoun use throughout this assessment reflects the patient's chosen pronoun.  The patient was referred for an assessment by self.  The patient attended the session alone.     Chief Complaint:   The patient had an assessment via a remote video visit at [facility] on 2022 with  "this counselor for evaluation of a possible substance use disorder.  The reason for the substance use disorder assessment was due to the patient's own awareness he needed help and due to pressure from his employer for him to get help.  The patient reported his job as a  is at risk secondary to missing days due to his alcohol abuse and he reported he does not know the current status of his employment.  The patient reported he had been sober while dating a girlfriend for 8-months and for 1-month after their break-up until he relapsed with alcohol in 4/2022.  From 4/2022 until mid-6/2022, he reported a pattern of drinking 3 beers and a few shots of hard alcohol around 1-time per week.  From mid-6/2022 until being hospitalized for acute alcohol intoxication on 7/12/2022, he reported a pattern of drinking between 6-8 hard seltzers and 4-5 shots of hard alcohol between 2-3 times per week.  The patient reported being sober again from 7/13/2022 until relapsing with alcohol in early 8/2022.  In early 8/2022, he reported going on a 2-week \"patrick\" with his ex-girlfriend when he reported a pattern of drinking between 6-8 hard seltzers and 1/2 liter of vodka on a daily basis until again being hospitalized for acute alcohol intoxication.  The patient is requesting a referral to enter [facility] in [place], [place] or a similar residential or board and lodging treatment program for substance use disorder treatment at this time.  The patient first had a concern about having substance abuse issues around 5 years ago.  The patient reported having 1 inpatient detoxification admission in 2014.  The patient denied having any history of participating in a substance use disorder treatment program.  The patient had just recently started to attend [facility] recovery group meetings and he would benefit from continuing to develop his sober peer support network.  The patient reported he had attempted to stop his use of alcohol " on his own in the past, but he had been unsuccessful in his attempts to maintain abstinence from alcohol.  The patient does not appear to be in severe withdrawal, an imminent safety risk to self or others, or requiring immediate medical attention and may proceed with the assessment interview.    Social/Family History:  The patient reported growing up in [place], [place].  The patient reported being raised by both of his biological parents in the same family home.  The patient reported discipline in his family home was in the form of verbal reprimands.  The patient denied experiencing or witnessing any verbal, physical or sexual abuse when he was growing up in the family home.  The patient reported feeling supported by all of his family members when he was growing up.  The patient reported being raised in the Yazidism Jainism.  The patient reported a history of:   Family History   Problem Relation Age of Onset     Anxiety Disorder Mother      Depression Mother      Substance Abuse Mother      Family History Negative Mother      Family History Negative Father      Anxiety Disorder Sister      Depression Sister      Substance Abuse Sister    The patient reported overall his childhood was a combination of happy and challenging due to growing up without a lot of resources or money.  The patient described his current relationships with his family of origin as being good overall with his father, but somewhat strained due to the patient's substance abuse.  The patient does not really have much contact with his 2 sister's one is  and lives away and the other sister is currently struggling with her own addiction issues.      The patient describes his cultural background as being a /White male of Polish decent.  Cultural influences and impact on patient's life structure, values, norms, and healthcare: The patient denied cultural concerns had an impact on life structure, values, norms, or healthcare.   Contextual influences on patient's health include: Family Factors: The patient reported his mother and his sister had a history of substance abuse.  The patient reported his mother had  suddenly and unexpectedly from to alcohol poisoning when he was just 18 years old.  The patient identified his preferred language to be English.  The patient reported he does not need the assistance of an  or other support involved in therapy.  The patient reported he is not currently involved in community of alex activities.  The patient felt his spirituality would likely play a limited role in his recovery.    The patient reported having no significant delays in developmental tasks.  The patient's highest education level was some college.  The patient identified the following learning problems: none reported.  The patient reports he is able to understand written materials.    The patient denied having any history of being .  The patient identified as being heterosexual and he reported being single and not in a romantic relationship at this time.  The patient denied having any children.     The patient reported living alone in an apartment.  The patient denied having any concerns regarding his immediate living environment and/or neighborhood and he plans to continue to live there.  The patient identified his father and his step-mother as being his primary support network at this time.  The patient identified the quality of his relationships with his support network as being good overall, but somewhat strained due to the patient's substance abuse.  The patient would like the following people involved in treatment services if recommended: None at this time.     The patient reported engaging in the following recreational/leisure activities: going for runs, drumming, cooking and playing video games.  The patient reported engaging in the following recreation/leisure activities while using alcohol or other  non-prescribed mood altering chemicals: while playing video games or watching TV.  The patient reported the following people are supportive of his recovery: his father and his step-mother.    The patient reported he had been working full-time as a  for the past 3-months.  The patient reported his job as a  is at risk secondary to missing days due to his alcohol abuse and he reported he does not know the current status of his employment.  The patient reported his income is obtained through employment.  The patient reported having financial stressors at this time, including money being tight at this time, being behind on some of his bills and having some debt.      The patient reported the following substance related arrests or legal issues: The patient reported having a DUI charge around 7-years ago and he denied having any other history of arrests or legal charges.  The patient denied being on court probation at this time.    Patient's Strengths and Limitations:  The patient identified the following strengths or resources that will help him succeed in treatment: exercise routine, family support and motivation.  Things that may interfere with the patient's success in treatment include: lack of a sober peer support network, financial stressors, mental health concerns and peer network mainly consists of other alcohol and/or drug users.     Assessments:  The following assessments were completed by patient for this visit:  PHQ9:   PHQ-9 SCORE 1/21/2008 3/2/2010 4/16/2012 8/23/2022   PHQ-9 Total Score 24 17 6 -   PHQ-9 Total Score MyChart - - - 9 (Mild depression)   PHQ-9 Total Score - - - 9     GAD7:   MISHEL-7 SCORE 8/23/2022   Total Score 15 (severe anxiety)   Total Score 15     PROMIS 10-Global Health (all questions and answers displayed):   PROMIS 10 8/23/2022   In general, would you say your health is: Good   In general, would you say your quality of life is: Fair   In general, how would you rate your physical  health? Good   In general, how would you rate your mental health, including your mood and your ability to think? Fair   In general, how would you rate your satisfaction with your social activities and relationships? Good   In general, please rate how well you carry out your usual social activities and roles Very good   To what extent are you able to carry out your everyday physical activities such as walking, climbing stairs, carrying groceries, or moving a chair? Completely   How often have you been bothered by emotional problems such as feeling anxious, depressed or irritable? Often   How would you rate your fatigue on average? Moderate   How would you rate your pain on average?   0 = No Pain  to  10 = Worst Imaginable Pain 0   In general, would you say your health is: 3   In general, would you say your quality of life is: 2   In general, how would you rate your physical health? 3   In general, how would you rate your mental health, including your mood and your ability to think? 2   In general, how would you rate your satisfaction with your social activities and relationships? 3   In general, please rate how well you carry out your usual social activities and roles. (This includes activities at home, at work and in your community, and responsibilities as a parent, child, spouse, employee, friend, etc.) 4   To what extent are you able to carry out your everyday physical activities such as walking, climbing stairs, carrying groceries, or moving a chair? 5   In the past 7 days, how often have you been bothered by emotional problems such as feeling anxious, depressed, or irritable? 4   In the past 7 days, how would you rate your fatigue on average? 3   In the past 7 days, how would you rate your pain on average, where 0 means no pain, and 10 means worst imaginable pain? 0   Global Mental Health Score 9   Global Physical Health Score 16   PROMIS TOTAL - SUBSCORES 25   Some recent data might be hidden     [place]  Suicide Severity Rating Scale (Lifetime/Recent)  [place] Suicide Severity Rating (Lifetime/Recent) 7/12/2022 8/24/2022   Q1 Wished to be Dead (Past Month) yes -   Q2 Suicidal Thoughts (Past Month) yes -   Q3 Suicidal Thought Method yes -   Q4 Suicidal Intent without Specific Plan no -   Q5 Suicide Intent with Specific Plan yes -   Q6 Suicide Behavior (Lifetime) no -   Level of Risk per Screen high risk -   1. Wish to be Dead (Lifetime) - 0   2. Non-Specific Active Suicidal Thoughts (Lifetime) - 0   Actual Attempt (Lifetime) - 0   Has subject engaged in non-suicidal self-injurious behavior? (Lifetime) - 0   Interrupted Attempts (Lifetime) - 0   Aborted or Self-Interrupted Attempt (Lifetime) - 0   Preparatory Acts or Behavior (Lifetime) - 0   Calculated C-SSRS Risk Score (Lifetime/Recent) - No Risk Indicated     Personal and Family Medical History:  The patient did report a family history of mental health concerns.    Family History   Problem Relation Age of Onset     Anxiety Disorder Mother      Depression Mother      Substance Abuse Mother      Family History Negative Mother      Family History Negative Father      Anxiety Disorder Sister      Depression Sister      Substance Abuse Sister       The patient reported the following previous mental health diagnoses: The patient reported a history of Depression NOS.  The patient reported his primary mental health symptoms include: depression, anxiety, sleep problems and symptoms related to past traumatic life events and these do not impact his ability to function.  The patient has received mental health services in the past: The patient reported a history of being prescribed psychotropic medications, but he is not prescribed any psychotropic medications at this time.  The patient reported a history of working with a 1:1 mental health therapist in the past, but he denied working with a 1:1 mental health therapist at this time.  Psychiatric Hospitalizations: None.  The  patient denies a history of civil commitment.  Current mental health services/providers include:  The patient reported a history of being prescribed psychotropic medications, but he is not prescribed any psychotropic medications at this time.  The patient reported a history of working with a 1:1 mental health therapist in the past, but he denied working with a 1:1 mental health therapist at this time.    GAIN-SS Tool:    When was the last time that you had significant problems... 8/24/2022   with feeling very trapped, lonely, sad, blue, depressed or hopeless about the future? Past month   with sleep trouble, such as bad dreams, sleeping restlessly, or falling asleep during the day? Past Month   with feeling very anxious, nervous, tense, scared, panicked or like something bad was going to happen? Past month   with becoming very distressed & upset when something reminded you of the past? Past month   with thinking about ending your life or committing suicide? Never     When was the last time that you did the following things 2 or more times? 8/24/2022   Lied or conned to get things you wanted or to avoid having to do something? Never   Had a hard time paying attention at school, work or home? 2 to 12 months ago   Had a hard time listening to instructions at school, work or home? 2 to 12 months ago   Were a bully or threatened other people? Never   Started physical fights with other people? Never     The patient has had a physical exam to rule out medical causes for current symptoms.  Date of last physical exam was within the past year. The patient was encouraged to follow up with PCP if symptoms were to develop.  The patient does not have a Primary Care Provider and was encouraged to establish care with a PCP.  The patient reported the following medical concerns:   Past Medical History:   Diagnosis Date     Allergic rhinitis, cause unspecified     trees, milk, soy     Depressive disorder      S/P tonsillectomy  2011    [facility]   The patient denied taking any medications at this time, but he would benefit from having a medical appointment to be evaluated for the need for medications at this time.  The patient denied having any current issues with pain.  The patient is male and is not pregnant.  There are not significant appetite / nutritional concerns / weight changes.  The patient does not report having a history of an eating disorder.  The patient does not report a history of head injury / trauma / cognitive impairment.      The patient denied taking any OTC or prescription medications at this time    Medication Adherence:  The patient reported denied being prescribed any medications at this time.    Patient Allergies:    Allergies   Allergen Reactions     Ambien [Zolpidem Tartrate]      Strange behavior     Medical History:    Past Medical History:   Diagnosis Date     Allergic rhinitis, cause unspecified     trees, milk, soy     Depressive disorder      S/P tonsillectomy 2011    [facility]     Substance Use:  The patient reported the following biological family members or relatives with chemical health issues: The patient reported his mother and his sister had a history of substance abuse.  The patient reported his mother had  suddenly and unexpectedly from to alcohol poisoning when he was just 18 years old.  The patient reported having 1 inpatient detoxification admission in .  The patient denied having any history of participating in a substance use disorder treatment program.  The patient had just recently started to attend [facility] recovery group meetings and he would benefit from continuing to develop his sober peer support network.  The patient denied having any history of participating in gambling treatment.  The patient is not currently receiving any substance use disorder treatment services.             X = Primary Drug Used   Age of First Use Most Recent Pattern of Use and Duration   Need enough  "information to show pattern (both frequency and amounts) and to show tolerance for each chemical that has a diagnosis   Date of last use and time, if needed   Withdrawal Potential? Requiring special care Method of use  (oral, smoked, snort, IV, etc)     X Alcohol     16 The patient reported his heaviest use of alcohol had been in 2016, when he reported a pattern of drinking 4-6 beers and 6 shots of hard alcohol on a daily basis.    The patient reported he had been sober while dating a girlfriend for 8-months and for 1-month after their break-up until he relapsed with alcohol in 4/2022.    From 4/2022 until mid-6/2022, he reported a pattern of drinking 3 beers and a few shots of hard alcohol around 1-time per week.    From mid-6/2022 until being hospitalized for acute alcohol intoxication on 7/12/2022, he reported a pattern of drinking between 6-8 hard seltzers and 4-5 shots of hard alcohol between 2-3 times per week.      The patient reported being sober again from 7/13/2022 until relapsing with alcohol in early 8/2022.      In early 8/2022, he reported going on a 2-week \"patrick\" with his ex-girlfriend when he reported a pattern of drinking between 6-8 hard seltzers and 1/2 liter of vodka on a daily basis until again being hospitalized for acute alcohol intoxication on 8/18/2022.   8/18/2022    (6-8 hard seltzers and 1/2 liter of vodka)   None Oral      Marijuana/  Hashish   No use          Cocaine/Crack     No use          Meth/  Amphetamines   No use          Heroin     No use          Other Opiates/  Synthetics   No use          Inhalants     No use          Benzodiazepines     No use          Hallucinogens     No use          Barbiturates/  Sedatives/  Hypnotics No use          Over-the-Counter Drugs   No use          Other     No use          Nicotine     15 The patient reported a current pattern of smoking a pack of cigarettes on a daily basis.   8/24/2022 None Smoke     The patient reported the following " problems as a result of their substance use: DUI, family problems, legal issues, occupational / vocational problems and relationship problems.  The patient is concerned about substance use.     The patient reports experiencing the following withdrawal symptoms within the past 12 months: sweating, shaky/jittery/tremors, unable to sleep, fatigue, sad/depressed feeling, vivid/unpleasant dreams, nausea/vomiting, dizziness, diminished appetite, unable to eat, confused/disrupted speech and anxiety/worry and the following within the past 30 days: sweating, shaky/jittery/tremors, unable to sleep, fatigue, sad/depressed feeling, vivid/unpleasant dreams, nausea/vomiting, dizziness, diminished appetite, unable to eat, confused/disrupted speech and anxiety/worry. (DSM-11)  The patient reported urges to use alcohol and nicotine.  (DSM-4)  The patient reported he has used more alcohol than intended and over a longer period of time than intended.  (DSM-1)  The patient reported he has had unsuccessful attempts to cut down or control use of alcohol and nicotine.  (DSM-2)  The patient reported his longest period of abstinence from alcohol had been for 9-months until relapsing in 4/2022 and his return to drinking alcohol was due to having a desire to drink alcohol and had occurred shortly after a break-up with his girlfriend.  The patient reported he has needed to use more alcohol and nicotine to achieve the same effect.  (DSM-10)  The patient does report diminished effect with use of same amount of alcohol and nicotine.  (DSM-10)     The patient does report a great deal of time is spent in activities necessary to obtain, use, or recover from alcohol effects.  (DSM-3)  The patient does report important social, occupational, or recreational activities are given up or reduced because of alcohol use.  (DSM-7)  Alcohol use is continued despite knowledge of having a persistent or recurrent physical or psychological problem that is likely  to have been caused or exacerbated by use.  (DSM-9)  The patient reported the following problem behaviors while under the influence of substances: The patient reported having relationship conflict with his father and ex-girlfriends, being more impulsive, being more argumentative, being more socially isolated, having blackouts and having some memory impairment when under the influence of alcohol.  (DSM-6)  The patient reported recurrent use of alcohol in physically hazardous such as driving a motor vehicle while under the influence.  (DSM-8)  The patient reported his recovery goal is: The patient's plan and goal is to abstain from alcohol and from all other non-prescribed mood altering chemicals.     The patient does not have other addictive behaviors he is concerned about at this time.  The patient reported his substance use has not negatively impacted his ability to function in a school setting within the past year.  (DSM-5)  The patient reported his substance use has negatively impacted his ability to function in a work setting.  The patient reported missing days of work and having decreased performance at work due to his substance use.  (DSM-5)  The patient's demographics and history impact his recovery in the following ways: The patient reported his mother and his sister had a history of substance abuse.  The patient reported his mother had  suddenly and unexpectedly from to alcohol poisoning when he was just 18 years old.      Dimension Scale Ratings:    Dimension 1 -  Acute Intoxication/Withdrawal: 0 - No Problem    Dimension 2 - Biomedical: 0 - No Problem    Dimension 3 - Emotional/Behavioral/Cognitive Conditions: 2 - Moderate Problem    Dimension 4 - Readiness to Change:  2 - Moderate Problem    Dimension 5 - Relapse/Continued Use/ Continued Problem Potential: 4 - Extreme Problem    Dimension 6 - Recovery Environment:  3 - Severe Problem    Significant Losses / Trauma / Abuse / Neglect Issues:   The  patient did not serve in the .  There are indications or report of significant loss, trauma, abuse or neglect issues related to: The patient denied having any history of being verbally, emotionally, physically or sexually abused.  The patient reported having a history of trauma issues due to his mother's sudden and unexpected death from alcohol poisoning when he was 18 years old and due to having a few bad break-ups with ex-girlfriends.  The patient denied having any history of suicide attempts and denied having any current suicide ideation.  The patient denied having any history of self-injurious behavior.    Concerns for possible neglect are not present.    Safety Assessment:   The patient denies current homicidal ideation and behaviors.  The patient denies current self-injurious ideation and behaviors.    The patient reported unsafe motor vehicle operation, reported having blackouts and reported having memory impairment associated with substance use.  The patient reported substance use associated with mental health symptoms.  The patient reported the following current concerns for their personal safety: None.  The patient reported there are not any firearms in the home.     History of Safety Concerns:  The patient denied a history of homicidal ideation.     The patient denied a history of personal safety concerns.    The patient denied a history of assaultive behaviors.    The patient denied having any history of sexual assault behaviors.  The patient denied having any history of being registered as a sex offender.    The patient reported a history of unsafe motor vehicle operation, reported a history of having blackouts and reported a history of having memory impairment associated with substance use.  The patient reported a history of substance use associated with mental health symptoms.  The patient reported the following protective factors: forward/future oriented thinking, dedication to  family/friends, safe and stable environment, regular physical activity, daily obligations, commitment to well-being, sense of meaning and sense of personal control or determination.    Risk Plan:  See Recommendations for Safety and Risk Management Plan    Review of Symptoms per patient report:  Substance Use:  blackouts, vomiting, hangovers, substance related decrease in work performance, work absence due to substance use, family relationship problems due to substance use, social problems related to substance use, driving under the influence and cravings/urges to use.     Collateral Contact Summary:   Collateral contacts contributing to this assessment:  The patient's electronic medical records were reviewed at time of assessment.    No additional collateral data had been obtained at the time of this documentation.     If court related records were reviewed, summarize here: None    Information from collateral contacts supported/largely agreed with information from the client and associated risk ratings.    Information in this assessment was obtained from the medical record and provided by the patient who is a good historian.        The patient will have open access to his substance use disorder assessment medical record.    Diagnostic Criteria:   1.)  Substance is often taken in larger amounts or over a longer period than was intended.  Met for:  alcohol.  2.)  There is persistent desire or unsuccessful efforts to cut down or control use of the substance.  Met for:  alcohol and nicotine.  3.)  A great deal of time is spent in activities necessary to obtain the substance, use the substance, or recover from its effects.  Met for:  alcohol.  4.)  Craving, or a strong desire or urge to use the substance.  Met for:  alcohol and nicotine.  5.)  Recurrent use of the substance resulting in a failure to fulfill major role obligations at work, school, or home.  Met for:  alcohol.  6.)  Continued use of the substance despite  having persistent or recurrent social or interpersonal problems caused or exacerbated by the effects of its use.  Met for:  alcohol.  7.)  Important social, occupational, or recreational activities are given up or reduced because of the substance.  Met for:  alcohol.  8.)  Recurrent use of the substance in which it is physically hazardous.  Met for:  alcohol.  9.)  Use of the substance is continued despite knowledge of having a persistent or recurrent physical or psychological problem that is likely to have been cause or exacerbated by the substance.  Met for:  alcohol.  10.)  Tolerance:  either a need for markedly increased amounts of the substance to achieve the desired effect or a markedly diminished effect with continued use of the dame amount of the substance.  Met for:  alcohol and nicotine.  11.)  Withdrawal:  either patient endorses characteristic withdrawal syndrome for the substance or the substance (or closely related substance) is taken to relieve or avoid withdrawal symptoms.  Met for:  alcohol and nicotine.    As evidenced by self report and criteria, the patient meets the following DSM-5 Diagnoses: (Sustained by DSM-5 Criteria Listed Above)      1.)  Alcohol Use Disorder Severe - 303.90 (F10.20)  2.)  Tobacco Use Disorder Moderate - 305.10 (F17.200)  3.)  History of Depression NOS, per patient self-report    Specify if: In early remission:  After full criteria for alcohol/drug use disorder were previously met, none of the criteria for alcohol/drug use disorder have been met for at least 3 months but for less than 12 months (with the exception that Criterion A4,  Craving or a strong desire or urge to use alcohol/drug  may be met).     In sustained remission:   After full criteria for alcohol use disorder were previously met, none of the criteria for alcohol/drug use disorder have been met at any time during a period of 12 months or longer (with the exception that Criterion A4,  Craving or strong desire  or urge to use alcohol/drug  may be met).     Specify if:   This additional specifier is used if the individual is in an environment where access to alcohol is restricted.    Mild: Presence of 2-3 symptoms  Moderate: Presence of 4-5 symptoms  Severe: Presence of 6 or more symptoms    Recommendations:     1. Plan for Safety and Risk Management:     It was recommended the patient call 911 or go to the local Emergency Department should there be any significant change in the above risk factors.            Report to child / adult protection services was NA.    2. [facility] Referrals:      Recommendations:      1.)  It was recommended the patient abstain from alcohol and from all other non-prescribed mood altering chemicals.   2.)  Have a mental health evaluation to address his current clinical mental health issues.  3.)  Follow all of the recommendations of his medical and mental health providers.  4.)  Follow all of the terms and conditions of his employer, including participating in random alcohol and drug screening with his employer as directed.  5.)  Enter the [facility] program at [facility] in [place], [place] or a similar residential or board and lodging treatment program for substance use disorder treatment.  6.)  Follow all of the recommendations of his substance use disorder treatment providers including entering an extended care program as needed.     Clinical Substantiation for the above recommendations: The patient has been unable to maintain abstinence from alcohol while living at his current home environment, lacks long-term sober maintenance skills, lacks sober coping skills, lacks awareness regarding the disease model of addiction, lacks a sober peer support network, has dual issues of mental health and substance abuse and has mental health issues which are exacerbated by substance abuse.    The patient reported he is willing to follow the above recommendations.    The patient would like the following  family or other support people involved in their treatment:  None at this time    The patient does not have any history of opioid abuse.      3.  Mental Health Referrals:     The patient would benefit from having a mental health evaluation to address his current mental health issues.    4. Cultural Concerns:    The patient did not identify having any cultural concerns regarding mental health, physical health, or substance use issues.     5. Recommendations for treatment focus:      Alcohol / Substance Use - See #2. [facility] Referrals above for details on recommendations.  Depressed Mood - See #3. Mental Health Referrals above for details on recommendations.    Provider Name/ Credentials:  VINEET Anand  August 24, 2022

## 2022-08-25 PROBLEM — F10.20 ALCOHOL USE DISORDER, SEVERE, DEPENDENCE (H): Status: ACTIVE | Noted: 2022-08-25

## 2022-11-19 ENCOUNTER — HEALTH MAINTENANCE LETTER (OUTPATIENT)
Age: 32
End: 2022-11-19

## 2023-04-15 ENCOUNTER — HEALTH MAINTENANCE LETTER (OUTPATIENT)
Age: 33
End: 2023-04-15

## 2024-06-16 ENCOUNTER — HEALTH MAINTENANCE LETTER (OUTPATIENT)
Age: 34
End: 2024-06-16

## 2025-06-21 ENCOUNTER — HEALTH MAINTENANCE LETTER (OUTPATIENT)
Age: 35
End: 2025-06-21